# Patient Record
Sex: FEMALE | Race: WHITE | NOT HISPANIC OR LATINO | Employment: UNEMPLOYED | URBAN - METROPOLITAN AREA
[De-identification: names, ages, dates, MRNs, and addresses within clinical notes are randomized per-mention and may not be internally consistent; named-entity substitution may affect disease eponyms.]

---

## 2022-01-20 PROBLEM — G89.29 CHRONIC RIGHT-SIDED LOW BACK PAIN WITHOUT SCIATICA: Status: ACTIVE | Noted: 2022-01-20

## 2022-01-20 PROBLEM — K90.0 CELIAC DISEASE: Status: ACTIVE | Noted: 2022-01-20

## 2022-01-20 PROBLEM — M54.50 CHRONIC RIGHT-SIDED LOW BACK PAIN WITHOUT SCIATICA: Status: ACTIVE | Noted: 2022-01-20

## 2022-01-20 PROBLEM — M79.7 FIBROMYALGIA: Status: ACTIVE | Noted: 2022-01-20

## 2022-01-20 PROBLEM — K29.30 CHRONIC SUPERFICIAL GASTRITIS WITHOUT BLEEDING: Status: ACTIVE | Noted: 2022-01-20

## 2022-11-08 ENCOUNTER — OFFICE VISIT (OUTPATIENT)
Dept: HEMATOLOGY/ONCOLOGY | Facility: CLINIC | Age: 57
End: 2022-11-08
Payer: OTHER GOVERNMENT

## 2022-11-08 VITALS
DIASTOLIC BLOOD PRESSURE: 82 MMHG | HEIGHT: 61 IN | WEIGHT: 159.38 LBS | BODY MASS INDEX: 30.09 KG/M2 | HEART RATE: 116 BPM | SYSTOLIC BLOOD PRESSURE: 154 MMHG | TEMPERATURE: 98 F | RESPIRATION RATE: 12 BRPM | OXYGEN SATURATION: 97 %

## 2022-11-08 DIAGNOSIS — M79.7 FIBROMYALGIA: ICD-10-CM

## 2022-11-08 DIAGNOSIS — K90.0 CELIAC DISEASE: Primary | ICD-10-CM

## 2022-11-08 PROCEDURE — 99204 PR OFFICE/OUTPT VISIT, NEW, LEVL IV, 45-59 MIN: ICD-10-PCS | Mod: S$PBB,,, | Performed by: INTERNAL MEDICINE

## 2022-11-08 PROCEDURE — 99999 PR PBB SHADOW E&M-EST. PATIENT-LVL III: ICD-10-PCS | Mod: PBBFAC,,, | Performed by: INTERNAL MEDICINE

## 2022-11-08 PROCEDURE — 99999 PR PBB SHADOW E&M-EST. PATIENT-LVL III: CPT | Mod: PBBFAC,,, | Performed by: INTERNAL MEDICINE

## 2022-11-08 PROCEDURE — 99204 OFFICE O/P NEW MOD 45 MIN: CPT | Mod: S$PBB,,, | Performed by: INTERNAL MEDICINE

## 2022-11-08 PROCEDURE — 99213 OFFICE O/P EST LOW 20 MIN: CPT | Mod: PBBFAC,PO | Performed by: INTERNAL MEDICINE

## 2022-11-08 RX ORDER — ERGOCALCIFEROL 1.25 MG/1
50000 CAPSULE ORAL DAILY
Qty: 120 CAPSULE | Refills: 2 | Status: SHIPPED | OUTPATIENT
Start: 2022-11-08 | End: 2023-11-08

## 2022-11-08 NOTE — Clinical Note
Cbc,c mp, b12, feritn iron folate vit d hgb electrophoteis and alpha globin when all this is resulted bring pt back. Alpha glogin TAKES TIME

## 2022-11-08 NOTE — PROGRESS NOTES
Subjective:       Patient ID: Val Cortes is a 57 y.o. female.    Chief Complaint: Establish Care    HPI  Pt used to live in [place], got , feels tired and bruises all the time. Anemia runs in family   Had all kinds of tests.  She looked it up, has celiac dz, fibromyalgia, has symptoms of pernicious anemia    Review of Systems    Fatigue bone crushing   No get up go, trigeminal migraines, tremors, dizzy, forgetful, cant concentrate, severe receding gums, teeth healthy.   Sore tounge, mouth sores, pain under rib cage, nausea, heartburn, bad low back pain, leg pain knee to ankle  Neuropathy like symptoms, dry hands brittle andrez, tachycardia, had cardiac w/u sob, night sweats   Irritable, anxious      Past Medical History:   Diagnosis Date    Celiac disease 2010    Fibromyalgia 2002     No past surgical history on file.  No family history on file.   Social History     Socioeconomic History    Marital status:    Tobacco Use    Smoking status: Never    Smokeless tobacco: Never   Substance and Sexual Activity    Alcohol use: Not Currently    Drug use: Never    Sexual activity: Not Currently     Review of patient's allergies indicates:   Allergen Reactions    Pregabalin      Other reaction(s): headaches, dizziness    Amoxicillin-pot clavulanate Nausea And Vomiting    Sulfa (sulfonamide antibiotics)     Penicillins Nausea And Vomiting       Current Outpatient Medications:     celecoxib (CELEBREX) 100 MG capsule, Take 1 capsule (100 mg total) by mouth once daily. (Patient not taking: Reported on 11/8/2022), Disp: 30 capsule, Rfl: 0    Physical Exam       Wt Readings from Last 3 Encounters:   11/08/22 72.3 kg (159 lb 6.3 oz)   01/20/22 70.5 kg (155 lb 8 oz)     Temp Readings from Last 3 Encounters:   11/08/22 97.6 °F (36.4 °C) (Temporal)   01/20/22 97 °F (36.1 °C) (Axillary)     BP Readings from Last 3 Encounters:   11/08/22 (!) 154/82   01/20/22 110/72     Pulse Readings from Last 3  Encounters:   11/08/22 (!) 116   01/20/22 104    VITAL SIGNS:  as above   GENERAL: appears well-built, well-nourished.  No anxiety, no agitation, and in no distress.  Patient is awake, alert, oriented and cooperative.  HEENT:  Showed no congestion. Trachea is central no obvious icterus or pallor noted no hoarseness. no obvious JVD   NECK:  Supple.  No JVD. No obvious cervical submental or supraclavicular adenopathy.  RS:the visualized portion of  Chest expands well. chest appears symmetric, no audible wheezes.  No dyspnea recognized  ABDOMEN:  abdomen appears undistended.  EXTREMITIES:  Without edema.  NEUROLOGICAL:  The patient is appropriate, higher functions are normal.  No  obvious neurological deficits.  normal judgement normal thought content  No confusion, no speech impediment. Cranial nerves are intact and show no deficit. No gross motor deficits noted   SKIN MUSCULOSKELETAL: no joint or skeletal deformity, no clubbing of nails.  No visible rash ecchymosis or petechiae     Lab Results   Component Value Date    WBC 5.82 01/21/2022    HGB 12.8 01/21/2022    HCT 39.5 01/21/2022    MCV 89 01/21/2022     01/21/2022       BMP  Lab Results   Component Value Date     01/21/2022    K 4.1 01/21/2022     01/21/2022    CO2 31 01/21/2022    BUN 15 01/21/2022    CREATININE 0.78 01/21/2022    CALCIUM 9.3 01/21/2022    ANIONGAP 5 (L) 01/21/2022    ESTGFRAFRICA >60 01/21/2022    EGFRNONAA >60 01/21/2022           Patient Active Problem List   Diagnosis    Celiac disease    Fibromyalgia    Chronic superficial gastritis without bleeding    Chronic right-sided low back pain without sciatica        Assessment and Plan     Multiple symptoms especially if neuropathy patient established neurologist closed to home  Will obtain CBC iron studies B12 folate vitamin-D levels and hemoglobin electrophoresis and alpha globin gene analysis and see patient back with above if all above workup is negative doubt doubt be  hematological source of her symptoms

## 2022-11-09 ENCOUNTER — PATIENT MESSAGE (OUTPATIENT)
Dept: HEMATOLOGY/ONCOLOGY | Facility: CLINIC | Age: 57
End: 2022-11-09
Payer: OTHER GOVERNMENT

## 2022-11-30 ENCOUNTER — OFFICE VISIT (OUTPATIENT)
Dept: HEMATOLOGY/ONCOLOGY | Facility: CLINIC | Age: 57
End: 2022-11-30
Payer: OTHER GOVERNMENT

## 2022-11-30 VITALS
OXYGEN SATURATION: 97 % | HEIGHT: 61 IN | RESPIRATION RATE: 14 BRPM | SYSTOLIC BLOOD PRESSURE: 178 MMHG | HEART RATE: 97 BPM | WEIGHT: 161.19 LBS | TEMPERATURE: 96 F | BODY MASS INDEX: 30.43 KG/M2 | DIASTOLIC BLOOD PRESSURE: 81 MMHG

## 2022-11-30 DIAGNOSIS — K90.0 CELIAC DISEASE: Primary | ICD-10-CM

## 2022-11-30 PROCEDURE — 99214 OFFICE O/P EST MOD 30 MIN: CPT | Mod: S$PBB,,, | Performed by: INTERNAL MEDICINE

## 2022-11-30 PROCEDURE — 99999 PR PBB SHADOW E&M-EST. PATIENT-LVL III: CPT | Mod: PBBFAC,,, | Performed by: INTERNAL MEDICINE

## 2022-11-30 PROCEDURE — 99999 PR PBB SHADOW E&M-EST. PATIENT-LVL III: ICD-10-PCS | Mod: PBBFAC,,, | Performed by: INTERNAL MEDICINE

## 2022-11-30 PROCEDURE — 99213 OFFICE O/P EST LOW 20 MIN: CPT | Mod: PBBFAC,PO | Performed by: INTERNAL MEDICINE

## 2022-11-30 PROCEDURE — 99214 PR OFFICE/OUTPT VISIT, EST, LEVL IV, 30-39 MIN: ICD-10-PCS | Mod: S$PBB,,, | Performed by: INTERNAL MEDICINE

## 2022-11-30 NOTE — PROGRESS NOTES
Subjective:       Patient ID: Val Cortes is a 57 y.o. female.    Chief Complaint: Anemia    HPI  Pt used to live in [place], got , feels tired and bruises all the time. Anemia runs in family   Had all kinds of tests.  She looked it up, has celiac dz, fibromyalgia, has symptoms of pernicious anemia    Review of Systems    Fatigue bone crushing   No get up go, trigeminal migraines, tremors, dizzy, forgetful, cant concentrate, severe receding gums, teeth healthy.   Sore tounge, mouth sores, pain under rib cage, nausea, heartburn, bad low back pain, leg pain knee to ankle  Neuropathy like symptoms, dry hands brittle andrez, tachycardia, had cardiac w/u sob, night sweats   Irritable, anxious      Past Medical History:   Diagnosis Date    Celiac disease 2010    Fibromyalgia 2002     No past surgical history on file.  No family history on file.   Social History     Socioeconomic History    Marital status:    Tobacco Use    Smoking status: Never    Smokeless tobacco: Never   Substance and Sexual Activity    Alcohol use: Not Currently    Drug use: Never    Sexual activity: Not Currently     Review of patient's allergies indicates:   Allergen Reactions    Pregabalin      Other reaction(s): headaches, dizziness    Amoxicillin-pot clavulanate Nausea And Vomiting    Sulfa (sulfonamide antibiotics)     Penicillins Nausea And Vomiting       Current Outpatient Medications:     celecoxib (CELEBREX) 100 MG capsule, Take 1 capsule (100 mg total) by mouth once daily. (Patient not taking: Reported on 11/8/2022), Disp: 30 capsule, Rfl: 0    ergocalciferol (VITAMIN D2) 50,000 unit Cap, Take 1 capsule (50,000 Units total) by mouth once daily., Disp: 120 capsule, Rfl: 2    Physical Exam       Wt Readings from Last 3 Encounters:   11/30/22 73.1 kg (161 lb 2.5 oz)   11/08/22 72.3 kg (159 lb 6.3 oz)   01/20/22 70.5 kg (155 lb 8 oz)     Temp Readings from Last 3 Encounters:   11/30/22 (!) 95.9 °F (35.5 °C)  (Temporal)   11/08/22 97.6 °F (36.4 °C) (Temporal)   01/20/22 97 °F (36.1 °C) (Axillary)     BP Readings from Last 3 Encounters:   11/30/22 (!) 178/81   11/08/22 (!) 154/82   01/20/22 110/72     Pulse Readings from Last 3 Encounters:   11/30/22 97   11/08/22 (!) 116   01/20/22 104    VITAL SIGNS:  as above   GENERAL: appears well-built, well-nourished.  No anxiety, no agitation, and in no distress.  Patient is awake, alert, oriented and cooperative.  HEENT:  Showed no congestion. Trachea is central no obvious icterus or pallor noted no hoarseness. no obvious JVD   NECK:  Supple.  No JVD. No obvious cervical submental or supraclavicular adenopathy.  RS:the visualized portion of  Chest expands well. chest appears symmetric, no audible wheezes.  No dyspnea recognized  ABDOMEN:  abdomen appears undistended.  EXTREMITIES:  Without edema.  NEUROLOGICAL:  The patient is appropriate, higher functions are normal.  No  obvious neurological deficits.  normal judgement normal thought content  No confusion, no speech impediment. Cranial nerves are intact and show no deficit. No gross motor deficits noted   SKIN MUSCULOSKELETAL: no joint or skeletal deformity, no clubbing of nails.  No visible rash ecchymosis or petechiae     Lab Results   Component Value Date    WBC 7.32 11/08/2022    HGB 13.1 11/08/2022    HCT 41.8 11/08/2022    MCV 93 11/08/2022     11/08/2022       BMP  Lab Results   Component Value Date     11/08/2022    K 4.2 11/08/2022     11/08/2022    CO2 30 11/08/2022    BUN 11 11/08/2022    CREATININE 0.77 11/08/2022    CALCIUM 9.5 11/08/2022    ANIONGAP 10 11/08/2022    ESTGFRAFRICA >60 01/21/2022    EGFRNONAA >60 01/21/2022           Patient Active Problem List   Diagnosis    Celiac disease    Fibromyalgia    Chronic superficial gastritis without bleeding    Chronic right-sided low back pain without sciatica        Assessment and Plan     Multiple symptoms especially if neuropathy patient  established neurologist closed to home  CBC iron studies B12 folate vitamin-D levels and hemoglobin electrophoresis and alpha globin gene analysis a are all mormal   No hemaptlogical source of her issues identified, dc from hem clinic.   Rtc prn